# Patient Record
Sex: FEMALE | Race: WHITE | Employment: FULL TIME | URBAN - NONMETROPOLITAN AREA
[De-identification: names, ages, dates, MRNs, and addresses within clinical notes are randomized per-mention and may not be internally consistent; named-entity substitution may affect disease eponyms.]

---

## 2018-05-24 ENCOUNTER — HOSPITAL ENCOUNTER (OUTPATIENT)
Dept: WOMENS IMAGING | Age: 48
Discharge: HOME | End: 2018-05-24
Payer: COMMERCIAL

## 2018-05-24 DIAGNOSIS — Z12.31 VISIT FOR SCREENING MAMMOGRAM: ICD-10-CM

## 2018-05-24 PROCEDURE — 77063 BREAST TOMOSYNTHESIS BI: CPT

## 2019-09-17 ENCOUNTER — HOSPITAL ENCOUNTER (OUTPATIENT)
Dept: WOMENS IMAGING | Age: 49
Discharge: HOME | End: 2019-09-17
Payer: COMMERCIAL

## 2019-09-17 DIAGNOSIS — Z12.31 VISIT FOR SCREENING MAMMOGRAM: ICD-10-CM

## 2019-09-17 PROCEDURE — 77063 BREAST TOMOSYNTHESIS BI: CPT

## 2020-01-28 ENCOUNTER — HOSPITAL ENCOUNTER (OUTPATIENT)
Dept: WOMENS IMAGING | Age: 50
Discharge: HOME | End: 2020-01-28
Payer: COMMERCIAL

## 2020-01-28 PROCEDURE — 77080 DXA BONE DENSITY AXIAL: CPT

## 2020-10-29 ENCOUNTER — HOSPITAL ENCOUNTER (OUTPATIENT)
Dept: WOMENS IMAGING | Age: 50
Discharge: HOME | End: 2020-10-29
Payer: COMMERCIAL

## 2020-10-29 PROCEDURE — 77063 BREAST TOMOSYNTHESIS BI: CPT

## 2021-06-01 ENCOUNTER — HOSPITAL ENCOUNTER (OUTPATIENT)
Dept: NON INVASIVE DIAGNOSTICS | Age: 51
Discharge: HOME | End: 2021-06-01
Payer: COMMERCIAL

## 2021-06-01 DIAGNOSIS — I47.1 SVT (SUPRAVENTRICULAR TACHYCARDIA) (HCC): ICD-10-CM

## 2021-06-01 LAB
LV EF: 55 %
LVEF MODALITY: NORMAL

## 2021-06-01 PROCEDURE — 93306 TTE W/DOPPLER COMPLETE: CPT

## 2021-06-03 ENCOUNTER — INITIAL CONSULT (OUTPATIENT)
Dept: PULMONOLOGY | Age: 51
End: 2021-06-03
Payer: COMMERCIAL

## 2021-06-03 VITALS
DIASTOLIC BLOOD PRESSURE: 78 MMHG | SYSTOLIC BLOOD PRESSURE: 132 MMHG | WEIGHT: 153.4 LBS | TEMPERATURE: 96.8 F | HEIGHT: 68 IN | HEART RATE: 54 BPM | OXYGEN SATURATION: 98 % | BODY MASS INDEX: 23.25 KG/M2

## 2021-06-03 DIAGNOSIS — F41.9 ANXIETY DISORDER, UNSPECIFIED TYPE: Primary | ICD-10-CM

## 2021-06-03 DIAGNOSIS — R06.83 SNORING: ICD-10-CM

## 2021-06-03 DIAGNOSIS — G47.30 SLEEP APNEA, UNSPECIFIED TYPE: ICD-10-CM

## 2021-06-03 DIAGNOSIS — R06.81 APNEA: ICD-10-CM

## 2021-06-03 PROCEDURE — 99204 OFFICE O/P NEW MOD 45 MIN: CPT | Performed by: INTERNAL MEDICINE

## 2021-06-03 RX ORDER — DILTIAZEM HYDROCHLORIDE 180 MG/1
180 CAPSULE, COATED, EXTENDED RELEASE ORAL DAILY
COMMUNITY

## 2021-06-03 RX ORDER — ALENDRONATE SODIUM 70 MG/1
70 TABLET ORAL
COMMUNITY

## 2021-06-03 RX ORDER — PROPRANOLOL HYDROCHLORIDE 10 MG/1
10 TABLET ORAL 3 TIMES DAILY
COMMUNITY

## 2021-06-03 NOTE — PATIENT INSTRUCTIONS
Recommendations/Plan:  - Schedule patient for nocturnal polysomnogram (Sleep study) at [facility] sleep lab. Patient verbalizes understanding. Patient to follow with my sleep [id number] after sleep study.  -I had a discussion with patient regarding avialable treatment options for her sleep disorder breathing including but not limited to CPAP titration in the sleep lab Vs.Dental appliance placement with referral to a local dentist Vs other available surgical options including Uvulopalatopharyngoplasty, maxillomandibular ostomy and tracheostomy as last option. At the end of discussion, she he is interested in going for dental appliance as a treatment if she found to have obstructive sleep apnea at this time.  -We will see Rich Jones back in 1week after the sleep study to go over the sleep study results and further management options.  -She was educated to practice good sleep hygiene practices. She was provided with a good sleep hygiene hand out.  -Glenis Eris was advised to make earlier appointment with my clinic if she develops any worsening of sleep symptoms. She verbalizes understanding.   - Rich Jones was educated about my impression and plan. She verbalizes understanding.

## 2021-06-03 NOTE — PROGRESS NOTES
Chief Complaint: Consult,contacted [facility] and [facility] for prior studies,none in patients chart over 10 years ago     Mallampati airway Class: 4  Neck Circumference: 12. 50Inches    [place] sleepiness score 6/3/21: {NUMBERS 1-24:41387}.

## 2021-06-19 NOTE — PROGRESS NOTES
[facility] and [facility] Follow up note      Ambar Raymundo                                            Chief Complaint and [place]: Ambar Raymundo is a 44 y. o.oldfemale came for follow up regarding her sleep study results. She underwent sleep study on 6/17/21. She denies any excessive day time sleepiness. She suffered 2 episodes of SVT in the last week. The 2 episodes of SVT happened at night time when she tried to go to rest room during her nocturnal awakenings. She is currently waiting for ablation of her SVT by Dr. Krystyna Garner at [facility]. She had a follow up scheduled next week with him at [facility]. Review of Systems:   General/Constitutional: She denies any fever or chills. HENT: Negative. chs  Eyes: Negative. Upper respiratory tract: No nasal stuffiness or post nasal drip. Lower respiratory tract/ lungs: No cough or recent worsening of shortness of breath. Cardiovascular: No palpitations  Gastrointestinal: No nausea or vomiting. Neurological: No focal neurologiacal weakness. Extremities: No edema. Musculoskeletal: No complaints. Genitourinary: No complaints. Hematological: Negative. Psychiatric/Behavioral: Negative. Skin: No itching. No past medical history on file. No past surgical history on file.     Social History     Tobacco Use    Smoking status: Former Smoker    Smokeless tobacco: Never Used   Substance Use Topics    Alcohol use: Not on file    Drug use: Not on file       Allergies   Allergen Reactions    Clindamycin/Lincomycin     Sulfa Antibiotics        Current Outpatient Medications   Medication Sig Dispense Refill    dilTIAZem (CARDIZEM CD) 180 MG extended release capsule Take 180 mg by mouth daily      propranolol (INDERAL) 10 MG tablet Take 10 mg by mouth 3 times daily prn      alendronate (FOSAMAX) 70 MG tablet Take 70 mg by mouth every 7 days       No current facility-administered medications for this visit. No family history on file. /66 (Site: Left Upper Arm, Position: Sitting, Cuff Size: Medium Adult)   Pulse 75   Temp 98.1 °F (36.7 °C)   SpO2 96% Comment: room air at rest  BMI:  There is no height or weight on file to calculate BMI. Mallampati airway Class:4  Neck Circumference:.12.50 Inches  [place] sleepiness score 6/24/21: 4  Sleep Apnea Quality Of Life Questionniare: 69      Physical Exam :  Constitutional: Patient appears moderately built and moderately nourished. No distress. Patient is oriented to person, place, and time. HENT:   Head: Normocephalic and atraumatic. Right Ear: External ear normal.   Left Ear: External ear normal.   Mouth/Throat: Oropharynx is clear and moist.   Eyes: Conjunctivae are normal. Pupils are equal and reactive to light. No scleral icterus. Neck: Neck supple. No JVD present. Cardiovascular: Normal rate, regular rhythm, normal heart sounds. No murmur heard. Pulmonary/Chest: Effort normal and breath sounds normal. No stridor. No respiratory distress. No wheezes. No rales. Abdominal: Soft. Patient exhibits no distension. No tenderness. Musculoskeletal: Normal range of motion. Extremities: Patient exhibits no erythema or no edema. Lymphadenopathy:  No cervical adenopathy. Neurological: Patient is alert and oriented to person, place, and time. Skin: Skin is warm and dry. Patient is not diaphoretic. Psychiatric: Patient  has a normal mood and affect. Diagnostic Data:    Sleep study done on : Performed on 6/17/21                Assesment:  -Mild snoring with no clinically significant obstructive sleep apnea. -Recurrent episodes of supraventricular tachycardia. She is currently following with Dr. Abdifatah Borjas MD at [facility] in [place]. She usually sees [facility]- ? CNP works with MD Cecile- Ronald Garcia, [facility]. She is waiting for ablation at [facility].   -Anxiety disorder. She is currently not on any treatment.  -She had a history of postpartum depression. She used to be on treatment with Zoloft in the past.  Patient quit taking Zoloft several years back.  -Hx of Bruxism. Recommendations/Plan:  -She was advised to continue to practice good sleep hygiene practices.   -Katie Loza was advised to make earlier appointment with my clinic if she develops any worsening of sleep symptoms including hypersomnia. She verbalizes understanding.   -Schedule patient for follow up with my clinic on PRN/As needed basis for sleep related issues. Patient to follow with his family physician closely.   -Echevarria Samrene was educated about my impression and plan. She verbalizes understanding.      -I personally reviewed updated the Past medical hx, Past surgical hx,Social hx, Family hx, Medications, Allergies in the discrete data section of the patient chart along with labs, Pulmonary medicine,Sleep medicine related, Pathological, Microbiological and Radiological investigations.

## 2021-06-22 DIAGNOSIS — G47.30 SLEEP APNEA, UNSPECIFIED TYPE: ICD-10-CM

## 2021-06-22 DIAGNOSIS — R06.81 APNEA: ICD-10-CM

## 2021-06-22 DIAGNOSIS — F41.9 ANXIETY DISORDER, UNSPECIFIED TYPE: ICD-10-CM

## 2021-06-22 DIAGNOSIS — R06.83 SNORING: ICD-10-CM

## 2021-06-24 ENCOUNTER — OFFICE VISIT (OUTPATIENT)
Dept: PULMONOLOGY | Age: 51
End: 2021-06-24
Payer: COMMERCIAL

## 2021-06-24 VITALS
HEART RATE: 75 BPM | TEMPERATURE: 98.1 F | DIASTOLIC BLOOD PRESSURE: 66 MMHG | OXYGEN SATURATION: 96 % | SYSTOLIC BLOOD PRESSURE: 118 MMHG

## 2021-06-24 DIAGNOSIS — G47.30 SLEEP APNEA, UNSPECIFIED TYPE: Primary | ICD-10-CM

## 2021-06-24 PROCEDURE — 99213 OFFICE O/P EST LOW 20 MIN: CPT | Performed by: INTERNAL MEDICINE

## 2021-06-24 NOTE — PROGRESS NOTES
Chief Complaint: Osiel Jeffries is here for Psg results    Mallampati airway Class:4  Neck Circumference:.12.50 Inches    [place] sleepiness score 6/24/21:   Sleep Apnea Quality Of Life Questionniare:.

## 2021-06-24 NOTE — PATIENT INSTRUCTIONS
Recommendations/Plan:  -She was advised to continue to practice good sleep hygiene practices.   -Marcellus Hudson was advised to make earlier appointment with my clinic if she develops any worsening of sleep symptoms including hypersomnia. She verbalizes understanding.   -Schedule patient for follow up with my clinic on PRN/As needed basis for sleep related issues. Patient to follow with his family physician closely.   -Katherine Shaw was educated about my impression and plan. She verbalizes understanding.

## 2021-10-14 ENCOUNTER — NURSE ONLY (OUTPATIENT)
Dept: LAB | Age: 51
End: 2021-10-14

## 2021-10-21 LAB — CYTOLOGY THIN PREP PAP: NORMAL

## 2021-11-04 ENCOUNTER — HOSPITAL ENCOUNTER (OUTPATIENT)
Dept: WOMENS IMAGING | Age: 51
Discharge: HOME | End: 2021-11-04
Payer: COMMERCIAL

## 2021-11-04 DIAGNOSIS — Z12.31 VISIT FOR SCREENING MAMMOGRAM: ICD-10-CM

## 2021-11-04 PROCEDURE — 77063 BREAST TOMOSYNTHESIS BI: CPT

## 2021-11-05 ENCOUNTER — HOSPITAL ENCOUNTER (OUTPATIENT)
Age: 51
Discharge: HOME | End: 2021-11-05
Payer: COMMERCIAL

## 2021-11-05 LAB
INFLUENZA A: NOT DETECTED
INFLUENZA B: NOT DETECTED
SARS-COV-2 RNA, RT PCR: NOT DETECTED

## 2021-11-05 PROCEDURE — 87636 SARSCOV2 & INF A&B AMP PRB: CPT

## 2022-12-22 ENCOUNTER — HOSPITAL ENCOUNTER (OUTPATIENT)
Dept: CT IMAGING | Age: 52
Discharge: HOME | End: 2022-12-22
Payer: COMMERCIAL

## 2022-12-22 ENCOUNTER — HOSPITAL ENCOUNTER (OUTPATIENT)
Dept: WOMENS IMAGING | Age: 52
Discharge: HOME | End: 2022-12-22
Payer: COMMERCIAL

## 2022-12-22 DIAGNOSIS — M80.071A: ICD-10-CM

## 2022-12-22 DIAGNOSIS — N20.0 KIDNEY STONE: ICD-10-CM

## 2022-12-22 DIAGNOSIS — Z12.31 VISIT FOR SCREENING MAMMOGRAM: ICD-10-CM

## 2022-12-22 PROCEDURE — 77080 DXA BONE DENSITY AXIAL: CPT

## 2022-12-22 PROCEDURE — 74176 CT ABD & PELVIS W/O CONTRAST: CPT

## 2022-12-22 PROCEDURE — 77067 SCR MAMMO BI INCL CAD: CPT

## 2024-01-18 ENCOUNTER — NURSE ONLY (OUTPATIENT)
Dept: LAB | Age: 54
End: 2024-01-18

## 2024-01-18 ENCOUNTER — HOSPITAL ENCOUNTER (OUTPATIENT)
Dept: WOMENS IMAGING | Age: 54
Discharge: HOME | End: 2024-01-18
Payer: COMMERCIAL

## 2024-01-18 VITALS — BODY MASS INDEX: 23.49 KG/M2 | HEIGHT: 68 IN | WEIGHT: 155 LBS

## 2024-01-18 DIAGNOSIS — Z12.31 VISIT FOR SCREENING MAMMOGRAM: ICD-10-CM

## 2024-01-18 PROCEDURE — 77063 BREAST TOMOSYNTHESIS BI: CPT

## 2024-01-25 ENCOUNTER — HOSPITAL ENCOUNTER (OUTPATIENT)
Dept: WOMENS IMAGING | Age: 54
Discharge: HOME | End: 2024-01-25
Attending: RADIOLOGY
Payer: COMMERCIAL

## 2024-01-25 DIAGNOSIS — R92.8 ABNORMAL MAMMOGRAM: ICD-10-CM

## 2024-01-25 PROCEDURE — G0279 TOMOSYNTHESIS, MAMMO: HCPCS

## 2024-02-06 LAB — CYTOLOGY THIN PREP PAP: NORMAL

## 2024-04-10 ENCOUNTER — APPOINTMENT (OUTPATIENT)
Age: 54
End: 2024-04-10
Attending: PHYSICIAN ASSISTANT
Payer: COMMERCIAL

## 2024-04-10 ENCOUNTER — HOSPITAL ENCOUNTER (OUTPATIENT)
Age: 54
Setting detail: OBSERVATION
Discharge: HOME | End: 2024-04-10
Attending: EMERGENCY MEDICINE | Admitting: PHYSICIAN ASSISTANT
Payer: COMMERCIAL

## 2024-04-10 VITALS
BODY MASS INDEX: 23.05 KG/M2 | DIASTOLIC BLOOD PRESSURE: 58 MMHG | WEIGHT: 152.12 LBS | RESPIRATION RATE: 18 BRPM | HEART RATE: 65 BPM | HEIGHT: 68 IN | SYSTOLIC BLOOD PRESSURE: 115 MMHG | TEMPERATURE: 98.1 F | OXYGEN SATURATION: 97 %

## 2024-04-10 DIAGNOSIS — I16.0 HYPERTENSIVE URGENCY: Primary | ICD-10-CM

## 2024-04-10 DIAGNOSIS — I48.92 ATRIAL FLUTTER, UNSPECIFIED TYPE (HCC): ICD-10-CM

## 2024-04-10 DIAGNOSIS — I47.10 SVT (SUPRAVENTRICULAR TACHYCARDIA) (HCC): ICD-10-CM

## 2024-04-10 LAB
ALBUMIN SERPL BCG-MCNC: 4.7 G/DL (ref 3.5–5.1)
ALP SERPL-CCNC: 49 U/L (ref 38–126)
ALT SERPL W/O P-5'-P-CCNC: 14 U/L (ref 11–66)
ANION GAP SERPL CALC-SCNC: 15 MEQ/L (ref 8–16)
AST SERPL-CCNC: 18 U/L (ref 5–40)
BASOPHILS ABSOLUTE: 0.1 THOU/MM3 (ref 0–0.1)
BASOPHILS NFR BLD AUTO: 0.6 %
BILIRUB CONJ SERPL-MCNC: < 0.2 MG/DL (ref 0–0.3)
BILIRUB SERPL-MCNC: 0.5 MG/DL (ref 0.3–1.2)
BUN SERPL-MCNC: 15 MG/DL (ref 7–22)
CALCIUM SERPL-MCNC: 9.2 MG/DL (ref 8.5–10.5)
CHLORIDE SERPL-SCNC: 99 MEQ/L (ref 98–111)
CO2 SERPL-SCNC: 22 MEQ/L (ref 23–33)
CREAT SERPL-MCNC: 0.6 MG/DL (ref 0.4–1.2)
DEPRECATED RDW RBC AUTO: 42.9 FL (ref 35–45)
ECHO AO ASC DIAM: 2.5 CM
ECHO AO ASCENDING AORTA INDEX: 1.37 CM/M2
ECHO AO SINUS VALSALVA DIAM: 2.8 CM
ECHO AO SINUS VALSALVA INDEX: 1.54 CM/M2
ECHO AO ST JNCT DIAM: 2.3 CM
ECHO AV CUSP MM: 1.8 CM
ECHO AV MEAN GRADIENT: 4 MMHG
ECHO AV MEAN VELOCITY: 0.9 M/S
ECHO AV PEAK GRADIENT: 7 MMHG
ECHO AV PEAK VELOCITY: 1.3 M/S
ECHO AV VELOCITY RATIO: 0.92
ECHO AV VTI: 25 CM
ECHO BSA: 1.82 M2
ECHO EST RA PRESSURE: 5 MMHG
ECHO LA AREA 2C: 12.2 CM2
ECHO LA AREA 4C: 9.5 CM2
ECHO LA DIAMETER INDEX: 1.37 CM/M2
ECHO LA DIAMETER: 2.5 CM
ECHO LA MAJOR AXIS: 4.1 CM
ECHO LA MINOR AXIS: 4.8 CM
ECHO LA VOL BP: 22 ML (ref 22–52)
ECHO LA VOL MOD A2C: 25 ML (ref 22–52)
ECHO LA VOL MOD A4C: 15 ML (ref 22–52)
ECHO LA VOL/BSA BIPLANE: 12 ML/M2 (ref 16–34)
ECHO LA VOLUME INDEX MOD A2C: 14 ML/M2 (ref 16–34)
ECHO LA VOLUME INDEX MOD A4C: 8 ML/M2 (ref 16–34)
ECHO LV E' LATERAL VELOCITY: 8 CM/S
ECHO LV E' SEPTAL VELOCITY: 7 CM/S
ECHO LV FRACTIONAL SHORTENING: 32 % (ref 28–44)
ECHO LV INTERNAL DIMENSION DIASTOLE INDEX: 1.7 CM/M2
ECHO LV INTERNAL DIMENSION DIASTOLIC: 3.1 CM (ref 3.9–5.3)
ECHO LV INTERNAL DIMENSION SYSTOLIC INDEX: 1.15 CM/M2
ECHO LV INTERNAL DIMENSION SYSTOLIC: 2.1 CM
ECHO LV ISOVOLUMETRIC RELAXATION TIME (IVRT): 92 MS
ECHO LV IVSD: 1 CM (ref 0.6–0.9)
ECHO LV MASS 2D: 86.2 G (ref 67–162)
ECHO LV MASS INDEX 2D: 47.4 G/M2 (ref 43–95)
ECHO LV POSTERIOR WALL DIASTOLIC: 1 CM (ref 0.6–0.9)
ECHO LV RELATIVE WALL THICKNESS RATIO: 0.65
ECHO LVOT AV VTI INDEX: 0.76
ECHO LVOT MEAN GRADIENT: 2 MMHG
ECHO LVOT PEAK GRADIENT: 5 MMHG
ECHO LVOT PEAK VELOCITY: 1.2 M/S
ECHO LVOT VTI: 19 CM
ECHO MV A VELOCITY: 0.78 M/S
ECHO MV E DECELERATION TIME (DT): 246 MS
ECHO MV E VELOCITY: 0.66 M/S
ECHO MV E/A RATIO: 0.85
ECHO MV E/E' LATERAL: 8.25
ECHO MV E/E' RATIO (AVERAGED): 8.84
ECHO PV MAX VELOCITY: 0.8 M/S
ECHO PV PEAK GRADIENT: 2 MMHG
ECHO RIGHT VENTRICULAR SYSTOLIC PRESSURE (RVSP): 18 MMHG
ECHO RV INTERNAL DIMENSION: 2.6 CM
ECHO RV TAPSE: 2.2 CM (ref 1.7–?)
ECHO TV E WAVE: 0.6 M/S
ECHO TV REGURGITANT MAX VELOCITY: 1.83 M/S
ECHO TV REGURGITANT PEAK GRADIENT: 13 MMHG
EOSINOPHIL NFR BLD AUTO: 0.7 %
EOSINOPHILS ABSOLUTE: 0.1 THOU/MM3 (ref 0–0.4)
ERYTHROCYTE [DISTWIDTH] IN BLOOD BY AUTOMATED COUNT: 13.2 % (ref 11.5–14.5)
GFR SERPL CREATININE-BSD FRML MDRD: > 90 ML/MIN/1.73M2
GLUCOSE SERPL-MCNC: 129 MG/DL (ref 70–108)
HCT VFR BLD AUTO: 44.5 % (ref 37–47)
HGB BLD-MCNC: 14.3 GM/DL (ref 12–16)
IMM GRANULOCYTES # BLD AUTO: 0.03 THOU/MM3 (ref 0–0.07)
IMM GRANULOCYTES NFR BLD AUTO: 0.3 %
LYMPHOCYTES ABSOLUTE: 2.5 THOU/MM3 (ref 1–4.8)
LYMPHOCYTES NFR BLD AUTO: 26.1 %
MAGNESIUM SERPL-MCNC: 1.8 MG/DL (ref 1.6–2.4)
MCH RBC QN AUTO: 28.9 PG (ref 26–33)
MCHC RBC AUTO-ENTMCNC: 32.1 GM/DL (ref 32.2–35.5)
MCV RBC AUTO: 90.1 FL (ref 81–99)
MONOCYTES ABSOLUTE: 0.5 THOU/MM3 (ref 0.4–1.3)
MONOCYTES NFR BLD AUTO: 5.3 %
NEUTROPHILS NFR BLD AUTO: 67 %
NRBC BLD AUTO-RTO: 0 /100 WBC
OSMOLALITY SERPL CALC.SUM OF ELEC: 274.5 MOSMOL/KG (ref 275–300)
PLATELET # BLD AUTO: 305 THOU/MM3 (ref 130–400)
PMV BLD AUTO: 10.1 FL (ref 9.4–12.4)
POTASSIUM SERPL-SCNC: 3.7 MEQ/L (ref 3.5–5.2)
PROT SERPL-MCNC: 8 G/DL (ref 6.1–8)
RBC # BLD AUTO: 4.94 MILL/MM3 (ref 4.2–5.4)
SEGMENTED NEUTROPHILS ABSOLUTE COUNT: 6.3 THOU/MM3 (ref 1.8–7.7)
SODIUM SERPL-SCNC: 136 MEQ/L (ref 135–145)
TROPONIN, HIGH SENSITIVITY: 13 NG/L (ref 0–12)
TROPONIN, HIGH SENSITIVITY: 15 NG/L (ref 0–12)
TROPONIN, HIGH SENSITIVITY: < 6 NG/L (ref 0–12)
TSH SERPL DL<=0.005 MIU/L-ACNC: 2.44 UIU/ML (ref 0.4–4.2)
WBC # BLD AUTO: 9.4 THOU/MM3 (ref 4.8–10.8)

## 2024-04-10 PROCEDURE — 99236 HOSP IP/OBS SAME DATE HI 85: CPT | Performed by: PHYSICIAN ASSISTANT

## 2024-04-10 PROCEDURE — 96365 THER/PROPH/DIAG IV INF INIT: CPT

## 2024-04-10 PROCEDURE — 84443 ASSAY THYROID STIM HORMONE: CPT

## 2024-04-10 PROCEDURE — G0378 HOSPITAL OBSERVATION PER HR: HCPCS

## 2024-04-10 PROCEDURE — 2500000003 HC RX 250 WO HCPCS: Performed by: STUDENT IN AN ORGANIZED HEALTH CARE EDUCATION/TRAINING PROGRAM

## 2024-04-10 PROCEDURE — 6370000000 HC RX 637 (ALT 250 FOR IP): Performed by: INTERNAL MEDICINE

## 2024-04-10 PROCEDURE — 6370000000 HC RX 637 (ALT 250 FOR IP): Performed by: PHYSICIAN ASSISTANT

## 2024-04-10 PROCEDURE — 80053 COMPREHEN METABOLIC PANEL: CPT

## 2024-04-10 PROCEDURE — 82248 BILIRUBIN DIRECT: CPT

## 2024-04-10 PROCEDURE — 85025 COMPLETE CBC W/AUTO DIFF WBC: CPT

## 2024-04-10 PROCEDURE — 93010 ELECTROCARDIOGRAM REPORT: CPT | Performed by: NUCLEAR MEDICINE

## 2024-04-10 PROCEDURE — 99285 EMERGENCY DEPT VISIT HI MDM: CPT

## 2024-04-10 PROCEDURE — 83735 ASSAY OF MAGNESIUM: CPT

## 2024-04-10 PROCEDURE — 96366 THER/PROPH/DIAG IV INF ADDON: CPT

## 2024-04-10 PROCEDURE — 6360000002 HC RX W HCPCS: Performed by: PHYSICIAN ASSISTANT

## 2024-04-10 PROCEDURE — 96376 TX/PRO/DX INJ SAME DRUG ADON: CPT

## 2024-04-10 PROCEDURE — 99223 1ST HOSP IP/OBS HIGH 75: CPT | Performed by: INTERNAL MEDICINE

## 2024-04-10 PROCEDURE — 96368 THER/DIAG CONCURRENT INF: CPT

## 2024-04-10 PROCEDURE — 36415 COLL VENOUS BLD VENIPUNCTURE: CPT

## 2024-04-10 PROCEDURE — 2580000003 HC RX 258: Performed by: STUDENT IN AN ORGANIZED HEALTH CARE EDUCATION/TRAINING PROGRAM

## 2024-04-10 PROCEDURE — 93306 TTE W/DOPPLER COMPLETE: CPT | Performed by: INTERNAL MEDICINE

## 2024-04-10 PROCEDURE — 93005 ELECTROCARDIOGRAM TRACING: CPT | Performed by: STUDENT IN AN ORGANIZED HEALTH CARE EDUCATION/TRAINING PROGRAM

## 2024-04-10 PROCEDURE — 2580000003 HC RX 258: Performed by: EMERGENCY MEDICINE

## 2024-04-10 PROCEDURE — 93306 TTE W/DOPPLER COMPLETE: CPT

## 2024-04-10 PROCEDURE — 84484 ASSAY OF TROPONIN QUANT: CPT

## 2024-04-10 RX ORDER — MAGNESIUM SULFATE IN WATER 40 MG/ML
2000 INJECTION, SOLUTION INTRAVENOUS PRN
Status: DISCONTINUED | OUTPATIENT
Start: 2024-04-10 | End: 2024-04-10 | Stop reason: HOSPADM

## 2024-04-10 RX ORDER — ONDANSETRON 2 MG/ML
4 INJECTION INTRAMUSCULAR; INTRAVENOUS EVERY 6 HOURS PRN
Status: DISCONTINUED | OUTPATIENT
Start: 2024-04-10 | End: 2024-04-10 | Stop reason: HOSPADM

## 2024-04-10 RX ORDER — ACETAMINOPHEN 325 MG/1
650 TABLET ORAL EVERY 6 HOURS PRN
Status: DISCONTINUED | OUTPATIENT
Start: 2024-04-10 | End: 2024-04-10 | Stop reason: HOSPADM

## 2024-04-10 RX ORDER — MAGNESIUM SULFATE IN WATER 40 MG/ML
2000 INJECTION, SOLUTION INTRAVENOUS ONCE
Status: COMPLETED | OUTPATIENT
Start: 2024-04-10 | End: 2024-04-10

## 2024-04-10 RX ORDER — ACETAMINOPHEN 650 MG/1
650 SUPPOSITORY RECTAL EVERY 6 HOURS PRN
Status: DISCONTINUED | OUTPATIENT
Start: 2024-04-10 | End: 2024-04-10 | Stop reason: HOSPADM

## 2024-04-10 RX ORDER — SODIUM CHLORIDE 9 MG/ML
INJECTION, SOLUTION INTRAVENOUS PRN
Status: DISCONTINUED | OUTPATIENT
Start: 2024-04-10 | End: 2024-04-10 | Stop reason: HOSPADM

## 2024-04-10 RX ORDER — CALCIUM CARBONATE 500(1250)
1000 TABLET ORAL DAILY
COMMUNITY

## 2024-04-10 RX ORDER — ADENOSINE 3 MG/ML
INJECTION, SOLUTION INTRAVENOUS
Status: DISCONTINUED
Start: 2024-04-10 | End: 2024-04-10

## 2024-04-10 RX ORDER — DILTIAZEM HYDROCHLORIDE 120 MG/1
120 CAPSULE, COATED, EXTENDED RELEASE ORAL DAILY
Qty: 30 CAPSULE | Refills: 0 | Status: SHIPPED | OUTPATIENT
Start: 2024-04-11

## 2024-04-10 RX ORDER — SODIUM CHLORIDE 0.9 % (FLUSH) 0.9 %
5-40 SYRINGE (ML) INJECTION PRN
Status: DISCONTINUED | OUTPATIENT
Start: 2024-04-10 | End: 2024-04-10 | Stop reason: HOSPADM

## 2024-04-10 RX ORDER — SODIUM CHLORIDE 0.9 % (FLUSH) 0.9 %
5-40 SYRINGE (ML) INJECTION EVERY 12 HOURS SCHEDULED
Status: DISCONTINUED | OUTPATIENT
Start: 2024-04-10 | End: 2024-04-10 | Stop reason: HOSPADM

## 2024-04-10 RX ORDER — 0.9 % SODIUM CHLORIDE 0.9 %
1000 INTRAVENOUS SOLUTION INTRAVENOUS ONCE
Status: COMPLETED | OUTPATIENT
Start: 2024-04-10 | End: 2024-04-10

## 2024-04-10 RX ORDER — POTASSIUM CHLORIDE 20 MEQ/1
40 TABLET, EXTENDED RELEASE ORAL PRN
Status: DISCONTINUED | OUTPATIENT
Start: 2024-04-10 | End: 2024-04-10 | Stop reason: HOSPADM

## 2024-04-10 RX ORDER — DILTIAZEM HYDROCHLORIDE 120 MG/1
120 CAPSULE, COATED, EXTENDED RELEASE ORAL DAILY
Status: DISCONTINUED | OUTPATIENT
Start: 2024-04-10 | End: 2024-04-10 | Stop reason: HOSPADM

## 2024-04-10 RX ORDER — ONDANSETRON 4 MG/1
4 TABLET, ORALLY DISINTEGRATING ORAL EVERY 8 HOURS PRN
Status: DISCONTINUED | OUTPATIENT
Start: 2024-04-10 | End: 2024-04-10 | Stop reason: HOSPADM

## 2024-04-10 RX ORDER — POLYETHYLENE GLYCOL 3350 17 G/17G
17 POWDER, FOR SOLUTION ORAL DAILY PRN
Status: DISCONTINUED | OUTPATIENT
Start: 2024-04-10 | End: 2024-04-10 | Stop reason: HOSPADM

## 2024-04-10 RX ORDER — POTASSIUM CHLORIDE 7.45 MG/ML
10 INJECTION INTRAVENOUS PRN
Status: DISCONTINUED | OUTPATIENT
Start: 2024-04-10 | End: 2024-04-10 | Stop reason: HOSPADM

## 2024-04-10 RX ORDER — DILTIAZEM HYDROCHLORIDE 5 MG/ML
20 INJECTION INTRAVENOUS ONCE
Status: COMPLETED | OUTPATIENT
Start: 2024-04-10 | End: 2024-04-10

## 2024-04-10 RX ADMIN — ACETAMINOPHEN 650 MG: 325 TABLET ORAL at 17:04

## 2024-04-10 RX ADMIN — SODIUM CHLORIDE 1000 ML: 9 INJECTION, SOLUTION INTRAVENOUS at 04:50

## 2024-04-10 RX ADMIN — DILTIAZEM HYDROCHLORIDE 5 MG/HR: 5 INJECTION INTRAVENOUS at 05:25

## 2024-04-10 RX ADMIN — MAGNESIUM SULFATE HEPTAHYDRATE 2000 MG: 40 INJECTION, SOLUTION INTRAVENOUS at 10:52

## 2024-04-10 RX ADMIN — DILTIAZEM HYDROCHLORIDE 120 MG: 120 CAPSULE, EXTENDED RELEASE ORAL at 14:52

## 2024-04-10 RX ADMIN — DILTIAZEM HYDROCHLORIDE 20 MG: 5 INJECTION, SOLUTION INTRAVENOUS at 05:09

## 2024-04-10 RX ADMIN — POTASSIUM BICARBONATE 40 MEQ: 782 TABLET, EFFERVESCENT ORAL at 10:48

## 2024-04-10 ASSESSMENT — PAIN - FUNCTIONAL ASSESSMENT: PAIN_FUNCTIONAL_ASSESSMENT: NONE - DENIES PAIN

## 2024-04-10 ASSESSMENT — PAIN DESCRIPTION - LOCATION: LOCATION: BACK;HEAD

## 2024-04-10 ASSESSMENT — PAIN DESCRIPTION - DESCRIPTORS: DESCRIPTORS: ACHING;OTHER (COMMENT)

## 2024-04-10 NOTE — PLAN OF CARE
Problem: Discharge Planning  Goal: Discharge to home or other facility with appropriate resources  Outcome: Completed     Problem: Cardiovascular - Adult  Goal: Maintains optimal cardiac output and hemodynamic stability  Outcome: Completed  Goal: Absence of cardiac dysrhythmias or at baseline  Outcome: Completed     Problem: Cardiovascular - Adult  Goal: Absence of cardiac dysrhythmias or at baseline  Outcome: Completed     Problem: Cardiovascular - Adult  Goal: Absence of cardiac dysrhythmias or at baseline  Outcome: Completed     Problem: Metabolic/Fluid and Electrolytes - Adult  Goal: Electrolytes maintained within normal limits  Outcome: Completed     Problem: Safety - Adult  Goal: Free from fall injury  Outcome: Completed

## 2024-04-10 NOTE — DISCHARGE SUMMARY
Hospital Medicine Discharge Summary      Patient Identification:   Tala Solorio   : 1970  MRN: [id number]   Account: [id number]   Patient's PCP: Pepe Elizondo MD    Admit Date: 4/10/2024   Discharge Date: 04/10/24    Admitting Hospitalist: Pako Hamlin PA-C  Discharge Hospitalist: Pako Hamlin PA-C       Discharge Diagnoses:  Supraventricular tachycardia  Suspect recurrence of AV brandi reentrant tachycardia versus atrial flutter.  Converted in the emergency department to normal sinus rhythm with Cardizem drip.  Echocardiogram unremarkable.  Transition to Cardizem 120 mg daily by cardiology.  Cleared for discharged with a plan to follow-up with the patient's EP specialist upon discharge.  Hypertensive urgency  Resolved.  Blood pressures in the emergency department noted to be in the 190s to low 200s upon arrival.  Suspect secondary to anxiety.  Follow-up with PCP for further evaluation.  Osteoporosis  On weekly alendronate and daily calcium supplementation.  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  Follow-up with Dr. Harrison upon discharge.    Hospital Course:   Tala Solorio is a 53 y.o. 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  The patient reports she had been feeling a general feeling of malaise for the last few days.  She woke up this morning and checked her pulse and it was 160 bpm.  She presented to the emergency department and was found to be in SVT.  The underlying rhythm was unknown.  She was given Cardizem and converted to normal sinus rhythm.  Cardiology was consulted and the Cardizem infusion was transitioned to Cardizem 120 mg daily.  An echocardiogram was performed and was unremarkable.  The patient remained free of arrhythmias throughout her hospital

## 2024-04-10 NOTE — ED PROVIDER NOTES
Glucose 129 (*)     All other components within normal limits   OSMOLALITY - Abnormal; Notable for the following components:    Osmolality Calc 274.5 (*)     All other components within normal limits   HEPATIC FUNCTION PANEL   MAGNESIUM   TROPONIN   TSH WITH REFLEX   ANION GAP   GLOMERULAR FILTRATION RATE, ESTIMATED       (Any cultures that may have been sent were not resulted at the time of this patient visit)  (A negative COVID-19 test should be interpreted as COVID no longer suspected unless otherwise noted in this encounter documentation note)  MEDICAL DECISION MAKING / ED COURSE:     ED Course as of 04/10/24 0605   Wed Apr 10, 2024   0441 EP PROCEDURE - EPS/ABLATION/DEVICE (1/18/24 1129)  Conclusions   1. SVT  ms was induced with atrial extrastimulus pacing.  Septal VA   time was <70 ms. Ventricular overdrive pacing resulted in successful   entrainment of the tachycardia and a \"VAHV\" response was observed   consistent with typical AVNRT.   2. Successful ablation of slow pathway using Carto 3D electroanatomical   mapping system.   3. No inducible SVT post ablation while on isoproterenol.    [TM]   0501 Heart rate improving to 135.  Hold off on adenosine for now.  I suspect the patient has atrial flutter.  Ordered diltiazem bolus and drip [TM]   0541 Troponin, High Sensitivity: < 6 [TM]   0605 Admission request sent to Manan Hamlin [TM]      ED Course User Index  [TM] Wyatt Carbajal MD     Vitals reviewed:  ED Triage Vitals   BP Temp Temp Source Pulse Respirations SpO2 Height Weight - Scale   04/10/24 0440 04/10/24 0437 04/10/24 0437 04/10/24 0437 04/10/24 0437 04/10/24 0437 -- 04/10/24 0437   (!) 192/100 98 °F (36.7 °C) Oral (!) 148 16 97 %  70.3 kg (155 lb)                 Summary of Patient Presentation (see ED course if left blank):      53-year-old female status post ablation 2 years ago for AVNRT presented today with 3 days of palpitations without chest pain.  Troponin undetectable.  Initial EKGs

## 2024-04-10 NOTE — H&P
Hospitalist History & Physical    Patient:  Tala Solorio    Unit/Bed:01/001A  YOB: 1970  MRN: [id number]   Acct: [id number]   PCP: Pepe Elizondo MD  Code Status: Full Code    Date of Service: The patient was seen and examined on 04/10/24 and admitted to Observation with an expected length of stay of less than two midnights due to medical therapy.     Chief Complaint: tachycardia    Assessment/Plan:    Supraventricular tachycardia  Suspect recurrence of AV brandi reentrant tachycardia versus atrial flutter.  Converted in the emergency department to normal sinus rhythm with Cardizem drip.  Continue Cardizem drip at this time.  Cardiology consult for assistance with determining underlying rhythm.  Check TTE.  Magnesium 1.8, potassium 3.7.  Replace to maintain above 2.0, 4.0.  Likely will benefit from cardiac event monitor/Holter upon discharge  Hypertensive urgency  Blood pressures in the emergency department noted to be in the 190s to low 200s upon arrival.  Suspect secondary to anxiety.  Blood pressures have improved substantially.  No history of hypertension.  Monitor.  Osteoporosis  On weekly alendronate and daily calcium supplementation.  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 Harrison in 2021.  Currently off all cardiac medications.      History of Present Illness:  Tala Solorio is a 53 y.o. female with a history of AV brandi reentrant tachycardia and osteoporosis who presented to [facility] with chief complaint of palpitations.  The patient states Friday evening she had a couple glasses of wine.  Since then, she reports she has felt a general malaise.  She has had some episodes of feeling shaky.  She also did have lobster on Saturday and was told that she may be allergic to this and is uncertain if this triggered any of her symptoms.  This morning, early in the morning she woke up and checked her heart rate and it was 160 bpm.  She felt as though

## 2024-04-10 NOTE — CONSULTS
out  Elevated BP as a sequelae of arrhythmia  Hx AVNRT s/p ablation 2021  HLD  Troponin elevation    Plan:  Patient presenting with questionable narrow complex tachycardia with consideration for AVNRT versus atrial flutter. Cannot be clearly defined as patient did not receive adenosine and was started on diltiazem with resolution. Would continue diltiazem infusion at this time with bridge to patient's previous diltiazem extended release 180mg daily. No indication for anticoagulation at this time (CHADVASC 1 secondary to gender).  Unclear etiology for arrhythmia. Would recommend avoidance of caffeine, chocolate, alcohol, or any QT prolonging agents. Monitor for signs and symptoms of infection.  Keep Mg>2, K>4  Additional medical management per primary  Recommendation for Holter monitor at discharge with outpatient follow up with Dr. Haile for advanced care recommendations  Complete ECHO to rule out structural abnormality  Troponin Elevation likely secondary to demand.Heart Score 4. Consideration for outpatient stress at an interval. Cardiac Calcium Scoring unremarkable. Low clinical suspicion for ischemic cardiomyopathy. Pending ECHO.   Recommendation for outpatient follow up with sleep medicine and otolaryngology secondary to previously identified sleep disordered breathing with patient admitted upper airway pathology.     Thank you for allowing us to participate in the care of this patient.  Please do not hesitate to call us with questions.    Electronically signed by Rolf Geronimo MD on 4/10/2024 at 8:38 AM  Please refer to attestation by Dr. Derick Carreon for advanced care recommendations.    This document was prepared at least partially through the use of voice recognition software. Although effort is taken to assure the accuracy of this document, it is possible that grammatical, syntax,  or spelling errors may occur.     Supervising Physician's Attestation Statement  I performed a history and physical

## 2024-04-10 NOTE — ED TRIAGE NOTES
Patient to the ED via private with c/o of a high HR. Patient states she felt \"weird\" yesterday and reports feeling palpitations this morning upon waking. Patient reports hx of SVT and an ablation. EKG complete. Dr. Carbajal at bedside.

## 2024-04-10 NOTE — ED NOTES
1 L ns started per verbal of Dr. Qureshi @ 999 ml/hr  
Dr. Qureshi and Dr. Carbajal at bedside  
Patient to the restroom with jeffry jane per patient demand  
Pt transported to [facility] on cart in stable condition. Floor contacted before transport and spoke with Dain  
Repeat EKG per Dr. Qureshi at bedside  
Report given to DONAVON Mejia  
HISTORY     No past medical history on file.        Electronically signed by Darvin Retana RN on 4/10/2024 at 6:34 AM

## 2024-04-11 LAB
EKG ATRIAL RATE: 135 BPM
EKG ATRIAL RATE: 142 BPM
EKG ATRIAL RATE: 92 BPM
EKG P AXIS: 56 DEGREES
EKG P AXIS: 72 DEGREES
EKG P-R INTERVAL: 114 MS
EKG P-R INTERVAL: 144 MS
EKG P-R INTERVAL: 160 MS
EKG Q-T INTERVAL: 294 MS
EKG Q-T INTERVAL: 310 MS
EKG Q-T INTERVAL: 360 MS
EKG QRS DURATION: 124 MS
EKG QRS DURATION: 130 MS
EKG QRS DURATION: 98 MS
EKG QTC CALCULATION (BAZETT): 445 MS
EKG QTC CALCULATION (BAZETT): 452 MS
EKG QTC CALCULATION (BAZETT): 465 MS
EKG R AXIS: 57 DEGREES
EKG R AXIS: 75 DEGREES
EKG R AXIS: 78 DEGREES
EKG T AXIS: -49 DEGREES
EKG T AXIS: -50 DEGREES
EKG T AXIS: 48 DEGREES
EKG VENTRICULAR RATE: 135 BPM
EKG VENTRICULAR RATE: 142 BPM
EKG VENTRICULAR RATE: 92 BPM

## 2025-01-28 ENCOUNTER — HOSPITAL ENCOUNTER (OUTPATIENT)
Dept: WOMENS IMAGING | Age: 55
Discharge: HOME | End: 2025-01-28
Attending: FAMILY MEDICINE
Payer: COMMERCIAL

## 2025-01-28 VITALS — HEIGHT: 68 IN | BODY MASS INDEX: 23.05 KG/M2 | WEIGHT: 152.12 LBS

## 2025-01-28 DIAGNOSIS — M81.0 AGE-RELATED OSTEOPOROSIS WITHOUT CURRENT PATHOLOGICAL FRACTURE: ICD-10-CM

## 2025-01-28 DIAGNOSIS — Z12.31 ENCOUNTER FOR SCREENING MAMMOGRAM FOR MALIGNANT NEOPLASM OF BREAST: ICD-10-CM

## 2025-01-28 PROCEDURE — 77067 SCR MAMMO BI INCL CAD: CPT

## 2025-01-28 PROCEDURE — 77080 DXA BONE DENSITY AXIAL: CPT

## 2025-01-30 ENCOUNTER — TRANSCRIBE ORDERS (OUTPATIENT)
Dept: ADMINISTRATIVE | Age: 55
End: 2025-01-30

## 2025-01-30 DIAGNOSIS — Z12.31 ENCOUNTER FOR SCREENING MAMMOGRAM FOR MALIGNANT NEOPLASM OF BREAST: Primary | ICD-10-CM

## 2025-03-11 ENCOUNTER — OFFICE VISIT (OUTPATIENT)
Dept: ALLERGY | Age: 55
End: 2025-03-11
Payer: COMMERCIAL

## 2025-03-11 ENCOUNTER — LAB (OUTPATIENT)
Dept: LAB | Age: 55
End: 2025-03-11

## 2025-03-11 VITALS
HEIGHT: 68 IN | OXYGEN SATURATION: 98 % | HEART RATE: 67 BPM | WEIGHT: 160 LBS | SYSTOLIC BLOOD PRESSURE: 133 MMHG | RESPIRATION RATE: 18 BRPM | DIASTOLIC BLOOD PRESSURE: 66 MMHG | BODY MASS INDEX: 24.25 KG/M2

## 2025-03-11 DIAGNOSIS — Z91.013 FISH ALLERGY: ICD-10-CM

## 2025-03-11 DIAGNOSIS — Z91.013 FISH ALLERGY: Primary | ICD-10-CM

## 2025-03-11 PROCEDURE — 3075F SYST BP GE 130 - 139MM HG: CPT | Performed by: NURSE PRACTITIONER

## 2025-03-11 PROCEDURE — 3078F DIAST BP <80 MM HG: CPT | Performed by: NURSE PRACTITIONER

## 2025-03-11 PROCEDURE — 99203 OFFICE O/P NEW LOW 30 MIN: CPT | Performed by: NURSE PRACTITIONER

## 2025-03-11 RX ORDER — PROPRANOLOL HCL 20 MG
1 TABLET ORAL DAILY
COMMUNITY
Start: 2024-11-19

## 2025-03-11 RX ORDER — DESOXIMETASONE 2.5 MG/G
CREAM TOPICAL 2 TIMES DAILY
COMMUNITY
Start: 2025-01-29

## 2025-03-11 NOTE — PROGRESS NOTES
Allergy & Asthma   [address] [place] [address]  Ph:[phone]  Fax: [phone]    Tala was seen today for new patient.    Diagnoses and all orders for this visit:    Fish allergy  -     Cancel: MISCELLANEOUS SENDOUT ARUP [id number]; Future  -     Odell IgE; Future  -     Oyster IgE; Future  -     Scallop IgE; Future  -     Seafood panel IgE; Future  -     Cancel: MISCELLANEOUS SENDOUT SOLE IGE [id number]; Future  -     Cancel: MISCELLANEOUS SENDOUT SWORDFISH IGE [id number]; Future  -     Cancel: MISCELLANEOUS SENDOUT [id number] Allergens, Food, Shell Fish Profile Component Test Code Allergen, Food, Crab IgE  Food, Shrimp IgE Blue Mussel IgE , Food, Lobster IgE Food, Clam Ige; Future  -     Cancel: MISCELLANEOUS SENDOUT arup [id number] Red Snapper IgE; Future  -     MISCELLANEOUS SENDOUT Squid IgE; Future        Chief Complaint:   Chief Complaint   Patient presents with    New Patient     Food allergies. Possible seafood. Rash in trunk area.        HISTORY OF PRESENT ILLNESS: NEW PATIENT TO PRACTICE    54 year old female.  Around 20 years ago she ate lobster and crab. Around one hour late she felt shaky and diarrhea. History of SVT. Then last year she tried shrimp but lobster was also cooked in same oil.  She had a cross sensitivity reaction. She became very shaky. She took benadryl which made it better,  within a few weeks her svt began acting up.  Around that time she also had shingles in 2020 and 2024.  She states she had a cardiac ablation twice for SVT 08/20 and 08/24. She reports that she has no further issue since that time.  She does have zaman and takes propranolol for the Zaman.  Patient is a new patient      She is here today to find out what fish/shellfish she is allergic to at this time.    Patient also has a rash on her trunk. She reports in the winter she seems to always have bumps. She is currently using a steroid cream. She also uses cerevae and vanicream mixed which does not completely control

## 2025-03-12 LAB
ALLERGEN CLAMS IGE: < 0.1 KU/L (ref 0–0.34)
ALLERGEN CODFISH IGE: < 0.1 KU/L (ref 0–0.34)
ALLERGEN CRAB IGE: < 0.1 KU/L (ref 0–0.34)
ALLERGEN LOBSTER IGE: < 0.1 KU/L (ref 0–0.34)
ALLERGEN SCALLOP IGE: < 0.1 KU/L (ref 0–0.34)
ALLERGEN TUNA IGE: < 0.1 KU/L (ref 0–0.34)
SHRIMP: < 0.1 KU/L (ref 0–0.34)

## 2025-03-14 LAB
DEPRECATED MISC ALLERGEN IGE RAST QL: NORMAL
DEPRECATED MISC ALLERGEN IGE RAST QL: NORMAL
MISC #3 REFERENCE REPORT: NORMAL
MISC REFERENCE: NORMAL
MISC REFERENCE: NORMAL
MISC. #1 REFERENCE GROUP TEST: NORMAL
MISC. #2 REFERENCE REPORT: NORMAL

## 2025-04-03 ENCOUNTER — OFFICE VISIT (OUTPATIENT)
Dept: ALLERGY | Age: 55
End: 2025-04-03
Payer: COMMERCIAL

## 2025-04-03 VITALS
BODY MASS INDEX: 25.01 KG/M2 | OXYGEN SATURATION: 96 % | HEART RATE: 50 BPM | DIASTOLIC BLOOD PRESSURE: 51 MMHG | SYSTOLIC BLOOD PRESSURE: 109 MMHG | WEIGHT: 165 LBS | HEIGHT: 68 IN

## 2025-04-03 DIAGNOSIS — Z91.013 FISH ALLERGY: Primary | ICD-10-CM

## 2025-04-03 PROCEDURE — 3074F SYST BP LT 130 MM HG: CPT | Performed by: NURSE PRACTITIONER

## 2025-04-03 PROCEDURE — 99213 OFFICE O/P EST LOW 20 MIN: CPT | Performed by: NURSE PRACTITIONER

## 2025-04-03 PROCEDURE — 3078F DIAST BP <80 MM HG: CPT | Performed by: NURSE PRACTITIONER

## 2025-04-03 ASSESSMENT — ENCOUNTER SYMPTOMS: RHINORRHEA: 0

## 2025-04-03 NOTE — PROGRESS NOTES
@[facility]    Allergy & Asthma   [address]  [facility] [place] [address]  Ph:   [phone]  Fax:[phone]     Provider:  Dr. Angelique Forbes    Follow Up         Tala was seen today for follow-up.    Diagnoses and all orders for this visit:    Fish allergy        CHIEF COMPLAINT:   Chief Complaint   Patient presents with    Follow-up     3 week lab follow up       HISTORY OF PRESENT ILLNESS: ESTABLISHED PATIENT HERE FOR EVALUATION   54 year old female.  Around 20 years ago she ate lobster and crab. Around one hour late she felt shaky and diarrhea. History of SVT. Then last year she tried shrimp but lobster was also cooked in same oil.  She had a cross sensitivity reaction. She became very shaky. She took benadryl which made it better,  within a few weeks her svt began acting up.  Around that time she also had shingles in 2020 and 2024.  She states she had a cardiac ablation twice for SVT 08/20 and 08/24. She reports that she has no further issue since that time.  She does have zaman and takes propranolol for the Zaman.  Patient is a new patient      She is here today to find out what fish/shellfish she is allergic to at this time.    Patient also has a rash on her trunk. She reports in the winter she seems to always have bumps. She is currently using a steroid cream. She also uses cerevae and vanicream mixed which does not completely control her rash. She has stopped eating chocolate. This year rash has been present since October 2024. She thinks it is something different this year and worse.  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  She is going to a dermatologist today 10 AM for further evaluation    Last week she went to [place] and ate shrimp, oysters, and swai fish and cod.  She did not have any problems. At this time she will continue to avoid lobster and crab.         Review of Systems:  Review of Systems   HENT:  Negative for congestion, postnasal drip and rhinorrhea.    Allergic/Immunologic: